# Patient Record
Sex: MALE | Race: WHITE | NOT HISPANIC OR LATINO | URBAN - METROPOLITAN AREA
[De-identification: names, ages, dates, MRNs, and addresses within clinical notes are randomized per-mention and may not be internally consistent; named-entity substitution may affect disease eponyms.]

---
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
## 2021-01-25 ENCOUNTER — APPOINTMENT (OUTPATIENT)
Dept: HEMATOLOGY ONCOLOGY | Facility: CLINIC | Age: 21
End: 2021-01-25
Payer: COMMERCIAL

## 2021-01-25 ENCOUNTER — LABORATORY RESULT (OUTPATIENT)
Age: 21
End: 2021-01-25

## 2021-01-25 VITALS
BODY MASS INDEX: 26.04 KG/M2 | DIASTOLIC BLOOD PRESSURE: 78 MMHG | HEART RATE: 79 BPM | HEIGHT: 71 IN | TEMPERATURE: 97.3 F | SYSTOLIC BLOOD PRESSURE: 130 MMHG | WEIGHT: 186 LBS

## 2021-01-25 PROCEDURE — 99213 OFFICE O/P EST LOW 20 MIN: CPT

## 2021-01-27 ENCOUNTER — RX RENEWAL (OUTPATIENT)
Age: 21
End: 2021-01-27

## 2021-01-30 LAB
HCT VFR BLD CALC: 46 %
HGB BLD-MCNC: 16.5 G/DL
MCHC RBC-ENTMCNC: 30.9 PG
MCHC RBC-ENTMCNC: 35.9 G/DL
MCV RBC AUTO: 86.1 FL
PLATELET # BLD AUTO: 171 K/UL
PMV BLD: 10 FL
PROT S AG ACT/NOR PPP IA: 20 %
PROT S FREE PPP-ACNC: 29 %
RBC # BLD: 5.34 M/UL
RBC # FLD: 11.6 %
WBC # FLD AUTO: 5.4 K/UL

## 2021-01-30 NOTE — ASSESSMENT
[FreeTextEntry1] : 21 yo male with FH of thrombophilia had unprovoked acute DVT in the left common femoral vein and in the left external iliac vein, s/p thrombectomy, on Eliquis.. \par Low Protein S activity and antigen.\par  \par Recommendation:\par -- Reviewed hypercoagulable workup results. Factor V Leiden and prothrombin gene mutation were negative. 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 Homocystine level was normal. ATIII and protein C activity were normal. Protein S activity and Ag were found low. Anticardiolipin antibodies and beta 2 glycoprotein I antibodies are not elevated.\par -- Protein S activity and antigen level can be affected by anticoagulant and other conditions. Will order repeat study today. \par -- Continue Eliquis 5 mg q12h. \par -- Discussed the duration of anticoagulation. Given he had unprovoked DVT at very young age and has protein S deficiency, he will need to be on long term AC. Will refer him to see Dr. Le for consultation.\par -- RTO for followup in 3 months. \par

## 2021-01-30 NOTE — CONSULT LETTER
[Courtesy Letter:] : I had the pleasure of seeing your patient, [unfilled], in my office today. [Please see my note below.] : Please see my note below. [Sincerely,] : Sincerely, [DrSammi  ___] : Dr. DIAZ [FreeTextEntry3] : Neo Aldana MD

## 2021-01-30 NOTE — HISTORY OF PRESENT ILLNESS
[de-identified] : 19 yo male with no significant PMH presented to ER at [facility] on 6/8/2020 for one month of left lower leg swelling. VA duplex revealed extensive DVT in the  the left left external iliac vein, left common femoral vein, CFV and popliteal vein. On the repeat Duplex, left low leg DVT resolved. He underwent thrombectomy on 6/9/2020. He was on heparin drip and then switched to Eliquis. There was no clear provoking event prior to the event. He has a family h/o thrombophilia in her father with PE at his age 35 and has been on life long anticoagulation.  He had hypercoagulable blood work while in the hospital. Factor V Leiden and prothrombin gene mutation were negative. 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 Homocystine level was normal. ATIII and protein C activity were normal. Protein S activity was low at 32%. These tests were done while he was on anticoagulation which may interfere with some of the test results. \par \par He was readmitted a week after he was discharged for high fever. Blood culture were sent. Blood culture grew MSSA. He has PCN allergy and was treated with Ancef. Fever subsided and repeat blood cultures were negative. JEREMIAS was negative. \par \par He is here today with his mother for the 1st office visit. He is feeling well. The left leg swelling has resolved. He has been taking Eliquis 5 mg q12h.  [de-identified] : 10/19/2020:\par The patient is here today for f/u visit. His mother is with him. He had unprovoked acute DVT in the left common femoral vein and in the left external iliac vein, s/p thrombectomy in 6/2020. He has been taking Eliquis 5 mg q12h. Hypercoagulable work showed Factor V Leiden and prothrombin gene mutation negative, ATIII normal. 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 Homocystine level was normal. ATIII and protein C activity were normal. Protein S activity was low at 32%. On 7/20/2020, repeat blood work was done. Protein S Ag was low at 17%. Protein S activity was also low at 40%. Since he is on Eliquis which may affect test result, a repeat study needs to be done in the future when he is off anticoagulant. Anticardiolipin antibodies and beta 2 glycoprotein I antibodies are not elevated.. He saw Dr. Westbrook on 10/2. Repeat venous duplex showed occlusive thrombus in the left femoral vein, partial resolution of thrombus in the left EIV, CFV and popliteal veins.\par He has been home since COVID19 pandemic. He put on about 10 pounds. \par \par 1/25/2021:\par The patient is here today for f/u visit. His mother is with him. He had unprovoked acute DVT in the left common femoral vein and in the left external iliac vein, s/p thrombectomy in 6/2020. He has been taking Eliquis 5 mg q12h. Hypercoagulable work was performed.  Factor V Leiden and prothrombin gene mutation were negative. 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 Homocystine level was normal. ATIII and protein C activity were normal. Protein S activity was low at 32% to 40% and Protein S Ag was low at 17%. Since he is on Eliquis which may affect test result, a repeat study needs to be done in the future when he is off anticoagulant. Anticardiolipin antibodies and beta 2 glycoprotein I antibodies are not elevated. He saw Dr. Westbrook on 10/5/2020. Repeat venous duplex showed occlusive thrombus in the left femoral vein, partial resolution of thrombus in the left EIV, CFV and popliteal veins. He feels well and does not have nay new complains.

## 2021-04-05 ENCOUNTER — APPOINTMENT (OUTPATIENT)
Dept: VASCULAR SURGERY | Facility: CLINIC | Age: 21
End: 2021-04-05
Payer: SUBSIDIZED

## 2021-04-05 ENCOUNTER — APPOINTMENT (OUTPATIENT)
Dept: VASCULAR SURGERY | Facility: CLINIC | Age: 21
End: 2021-04-05
Payer: COMMERCIAL

## 2021-04-05 VITALS — SYSTOLIC BLOOD PRESSURE: 126 MMHG | DIASTOLIC BLOOD PRESSURE: 89 MMHG

## 2021-04-05 VITALS — TEMPERATURE: 98 F

## 2021-04-05 PROCEDURE — 93971 EXTREMITY STUDY: CPT

## 2021-04-05 PROCEDURE — 99072 ADDL SUPL MATRL&STAF TM PHE: CPT

## 2021-04-05 PROCEDURE — 99213 OFFICE O/P EST LOW 20 MIN: CPT

## 2021-04-05 PROCEDURE — XXXXX: CPT

## 2021-04-05 NOTE — ASSESSMENT
[FreeTextEntry1] : 20 y/o gentleman with family h/o DVT (father on lifelong Coumadin),with unprovoked LLE DVT s/p thrombectomy and  on Eliquis since June 2020. \par \par Venous duplex performed today showed occlusive thrombus in the left  femoral vein, partial resolution of thrombus in the left EIV, CFV and popliteal veins.\par \par I have informed him of the test results and advised to continue on anticoagulation, follow up with Hematology as scheduled and see me back in 1 years time for repeat venous duplex.

## 2021-04-05 NOTE — DATA REVIEWED
[FreeTextEntry1] : I performed a venous duplex which was medically necessary to evaluate for acute DVT. It showed  left  partial recanalization of thrombus in the CF vein minimal recanalization in the distal femoral vein, partial

## 2021-04-05 NOTE — HISTORY OF PRESENT ILLNESS
[FreeTextEntry1] : 18 y/o gentleman with family h/o DVT (father on lifelong Coumadin), presented to ED in June 2020 for one month of left leg swelling and calf pain, found to have extensive DVT and underwent thrombectomy and venoplasty on 6/9/20, was discharged but readmitted for sepsis completed IV antibiotics.\par \par He presents for follow up, denies any new symptoms, has been compliant with stockings and Eliquis.

## 2021-04-26 ENCOUNTER — APPOINTMENT (OUTPATIENT)
Dept: HEMATOLOGY ONCOLOGY | Facility: CLINIC | Age: 21
End: 2021-04-26
Payer: COMMERCIAL

## 2021-04-26 ENCOUNTER — OUTPATIENT (OUTPATIENT)
Dept: OUTPATIENT SERVICES | Facility: HOSPITAL | Age: 21
LOS: 1 days | Discharge: HOME | End: 2021-04-26

## 2021-04-26 ENCOUNTER — LABORATORY RESULT (OUTPATIENT)
Age: 21
End: 2021-04-26

## 2021-04-26 VITALS
SYSTOLIC BLOOD PRESSURE: 126 MMHG | BODY MASS INDEX: 25.9 KG/M2 | WEIGHT: 185 LBS | HEIGHT: 71 IN | TEMPERATURE: 98.7 F | HEART RATE: 66 BPM | DIASTOLIC BLOOD PRESSURE: 60 MMHG

## 2021-04-26 DIAGNOSIS — Z92.89 PERSONAL HISTORY OF OTHER MEDICAL TREATMENT: Chronic | ICD-10-CM

## 2021-04-26 DIAGNOSIS — D68.59 OTHER PRIMARY THROMBOPHILIA: ICD-10-CM

## 2021-04-26 DIAGNOSIS — I82.412 ACUTE EMBOLISM AND THROMBOSIS OF LEFT FEMORAL VEIN: ICD-10-CM

## 2021-04-26 LAB
ALBUMIN SERPL ELPH-MCNC: 4.8 G/DL
ALP BLD-CCNC: 67 U/L
ALT SERPL-CCNC: 18 U/L
ANION GAP SERPL CALC-SCNC: 14 MMOL/L
AST SERPL-CCNC: 18 U/L
BILIRUB DIRECT SERPL-MCNC: <0.2 MG/DL
BILIRUB INDIRECT SERPL-MCNC: >0.5 MG/DL
BILIRUB SERPL-MCNC: 0.7 MG/DL
BUN SERPL-MCNC: 12 MG/DL
CALCIUM SERPL-MCNC: 9.9 MG/DL
CHLORIDE SERPL-SCNC: 104 MMOL/L
CO2 SERPL-SCNC: 23 MMOL/L
CREAT SERPL-MCNC: 0.9 MG/DL
GLUCOSE SERPL-MCNC: 98 MG/DL
HCT VFR BLD CALC: 44.1 %
HGB BLD-MCNC: 15.9 G/DL
MCHC RBC-ENTMCNC: 30.6 PG
MCHC RBC-ENTMCNC: 36.1 G/DL
MCV RBC AUTO: 84.8 FL
PLATELET # BLD AUTO: 214 K/UL
PMV BLD: 9.5 FL
POTASSIUM SERPL-SCNC: 4.2 MMOL/L
PROT SERPL-MCNC: 7.3 G/DL
RBC # BLD: 5.2 M/UL
RBC # FLD: 11.7 %
SODIUM SERPL-SCNC: 141 MMOL/L
WBC # FLD AUTO: 5.08 K/UL

## 2021-04-26 PROCEDURE — 99214 OFFICE O/P EST MOD 30 MIN: CPT

## 2021-04-26 NOTE — HISTORY OF PRESENT ILLNESS
[de-identified] : 21 yo male with no significant PMH presented to ER at [facility] on 6/8/2020 for one month of left lower leg swelling. VA duplex revealed extensive DVT in the  the left left external iliac vein, left common femoral vein, CFV and popliteal vein. On the repeat Duplex, left low leg DVT resolved. He underwent thrombectomy on 6/9/2020. He was on heparin drip and then switched to Eliquis. There was no clear provoking event prior to the event. He has a family h/o thrombophilia in her father with PE at his age 35 and has been on life long anticoagulation.  He had hypercoagulable blood work while in the hospital. Factor V Leiden and prothrombin gene mutation were negative. 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 Homocystine level was normal. ATIII and protein C activity were normal. Protein S activity was low at 32%. These tests were done while he was on anticoagulation which may interfere with some of the test results. \par \par He was readmitted a week after he was discharged for high fever. Blood culture were sent. Blood culture grew MSSA. He has PCN allergy and was treated with Ancef. Fever subsided and repeat blood cultures were negative. JEREMIAS was negative. \par \par He is here today with his mother for the 1st office visit. He is feeling well. The left leg swelling has resolved. He has been taking Eliquis 5 mg q12h.  [de-identified] : 10/19/2020:\par The patient is here today for f/u visit. He is accompanied by his mother. He had unprovoked acute DVT in the left common femoral vein and in the left external iliac vein, s/p thrombectomy in 6/2020. He has been taking Eliquis 5 mg q12h. Hypercoagulable work showed Factor V Leiden and prothrombin gene mutation negative, ATIII normal. 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 Homocystine level was normal. ATIII and protein C activity were normal. Protein S activity was low at 32%. On 7/20/2020, repeat blood work was done. Protein S Ag was low at 17%. Protein S activity was also low at 40%. Since he is on Eliquis which may affect test result, a repeat study needs to be done in the future when he is off anticoagulant. Anticardiolipin antibodies and beta 2 glycoprotein I antibodies are not elevated.. He saw Dr. Westbrook on 10/2. Repeat venous duplex showed occlusive thrombus in the left femoral vein, partial resolution of thrombus in the left EIV, CFV and popliteal veins.\par He has been home since COVID19 pandemic. He put on about 10 pounds. \par \par 1/25/2021:\par The patient is here today for f/u visit. His mother is with him. He had unprovoked acute DVT in the left common femoral vein and in the left external iliac vein, s/p thrombectomy in 6/2020. He has been taking Eliquis 5 mg q12h. Hypercoagulable work was performed.  Factor V Leiden and prothrombin gene mutation were negative. 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 Homocystine level was normal. ATIII and protein C activity were normal. Protein S activity was low at 32% to 40% and Protein S Ag was low at 17%. Since he is on Eliquis which may affect test result, a repeat study needs to be done in the future when he is off anticoagulant. Anticardiolipin antibodies and beta 2 glycoprotein I antibodies are not elevated. He saw Dr. Westbrook on 10/5/2020. Repeat venous duplex showed occlusive thrombus in the left femoral vein, partial resolution of thrombus in the left EIV, CFV and popliteal veins. He feels well and does not have nay new complains.\par \par 04/26/2021: \par The patient is here today for f/u visit. He is accompanied by his mother. He had unprovoked acute DVT in the left common femoral vein and in the left external iliac vein, s/p thrombectomy in 6/2020. He has been taking Eliquis 5 mg q12h. Hypercoagulable work was performed.  Factor V Leiden and prothrombin gene mutation were negative. Anticardiolipin antibodies and beta 2 glycoprotein I antibodies are not elevated. 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 Homocystine level was normal. ATIII and protein C activity were normal. Protein S activity was low at 40% and repeat in January 29% and Protein S Ag was low at 17% and repeat in January 20%. He saw Dr Westbrook on 4/05/21 venous duplex performed showed unchanged occlusive thrombus in the left femoral vein, partial resolution of thrombus in the left EIV, CFV and popliteal veins. He feels well and offers no complaints.

## 2021-04-27 LAB
T4 FREE SERPL-MCNC: 1.6 NG/DL
TSH SERPL-ACNC: 1.45 UIU/ML

## 2021-06-01 ENCOUNTER — RX RENEWAL (OUTPATIENT)
Age: 21
End: 2021-06-01

## 2021-06-29 ENCOUNTER — TRANSCRIPTION ENCOUNTER (OUTPATIENT)
Age: 21
End: 2021-06-29

## 2021-06-29 ENCOUNTER — APPOINTMENT (OUTPATIENT)
Dept: HEMATOLOGY ONCOLOGY | Facility: CLINIC | Age: 21
End: 2021-06-29
Payer: COMMERCIAL

## 2021-06-29 PROCEDURE — 99204 OFFICE O/P NEW MOD 45 MIN: CPT | Mod: 95

## 2021-07-09 ENCOUNTER — LABORATORY RESULT (OUTPATIENT)
Age: 21
End: 2021-07-09

## 2021-08-03 ENCOUNTER — OUTPATIENT (OUTPATIENT)
Dept: OUTPATIENT SERVICES | Facility: HOSPITAL | Age: 21
LOS: 1 days | Discharge: HOME | End: 2021-08-03

## 2021-08-03 ENCOUNTER — APPOINTMENT (OUTPATIENT)
Dept: HEMATOLOGY ONCOLOGY | Facility: CLINIC | Age: 21
End: 2021-08-03
Payer: COMMERCIAL

## 2021-08-03 VITALS
SYSTOLIC BLOOD PRESSURE: 136 MMHG | OXYGEN SATURATION: 98 % | RESPIRATION RATE: 16 BRPM | BODY MASS INDEX: 44.1 KG/M2 | HEART RATE: 71 BPM | WEIGHT: 315 LBS | TEMPERATURE: 98.7 F | DIASTOLIC BLOOD PRESSURE: 63 MMHG | HEIGHT: 71 IN

## 2021-08-03 DIAGNOSIS — Z92.89 PERSONAL HISTORY OF OTHER MEDICAL TREATMENT: Chronic | ICD-10-CM

## 2021-08-03 PROCEDURE — 99215 OFFICE O/P EST HI 40 MIN: CPT

## 2021-08-04 DIAGNOSIS — I82.412 ACUTE EMBOLISM AND THROMBOSIS OF LEFT FEMORAL VEIN: ICD-10-CM

## 2021-08-04 LAB
APTT BLD: 36.7 SEC
CONFIRM: 50.8 SEC
DRVVT 1H NP PPP: 45.6 SEC
DRVVT IMM 1:2 NP PPP: NORMAL
DRVVT SCREEN TO CONFIRM RATIO: 0.88 RATIO
FACT VIII ACT/NOR PPP: 104 %
INR PPP: 1.36 RATIO
PROT C PPP CHRO-ACNC: 66 %
PROT S AG ACT/NOR PPP IA: 19 %
PT BLD: 15.6 SEC
SCREEN DRVVT: 60.3 SEC
SILICA CLOTTING TIME INTERPRETATION: NORMAL
SILICA CLOTTING TIME S/C: 0.89 RATIO

## 2021-08-09 ENCOUNTER — NON-APPOINTMENT (OUTPATIENT)
Age: 21
End: 2021-08-09

## 2021-08-31 LAB
ANA SER IF-ACNC: NEGATIVE
PROT S FREE PPP-ACNC: 25 %

## 2021-12-16 ENCOUNTER — APPOINTMENT (OUTPATIENT)
Dept: HEMATOLOGY ONCOLOGY | Facility: CLINIC | Age: 21
End: 2021-12-16

## 2022-02-01 ENCOUNTER — OUTPATIENT (OUTPATIENT)
Dept: OUTPATIENT SERVICES | Facility: HOSPITAL | Age: 22
LOS: 1 days | Discharge: HOME | End: 2022-02-01

## 2022-02-01 ENCOUNTER — LABORATORY RESULT (OUTPATIENT)
Age: 22
End: 2022-02-01

## 2022-02-01 ENCOUNTER — APPOINTMENT (OUTPATIENT)
Dept: HEMATOLOGY ONCOLOGY | Facility: CLINIC | Age: 22
End: 2022-02-01
Payer: COMMERCIAL

## 2022-02-01 VITALS
OXYGEN SATURATION: 98 % | HEART RATE: 85 BPM | DIASTOLIC BLOOD PRESSURE: 73 MMHG | SYSTOLIC BLOOD PRESSURE: 139 MMHG | RESPIRATION RATE: 18 BRPM | HEIGHT: 71 IN | BODY MASS INDEX: 26.6 KG/M2 | WEIGHT: 189.99 LBS

## 2022-02-01 DIAGNOSIS — I82.412 ACUTE EMBOLISM AND THROMBOSIS OF LEFT FEMORAL VEIN: ICD-10-CM

## 2022-02-01 DIAGNOSIS — Z92.89 PERSONAL HISTORY OF OTHER MEDICAL TREATMENT: Chronic | ICD-10-CM

## 2022-02-01 LAB
HCT VFR BLD CALC: 43.9 %
HGB BLD-MCNC: 15.8 G/DL
MCHC RBC-ENTMCNC: 30.7 PG
MCHC RBC-ENTMCNC: 36 G/DL
MCV RBC AUTO: 85.2 FL
PLATELET # BLD AUTO: 182 K/UL
PMV BLD: 9.6 FL
RBC # BLD: 5.15 M/UL
RBC # FLD: 11.9 %
WBC # FLD AUTO: 4.13 K/UL

## 2022-02-01 PROCEDURE — 99215 OFFICE O/P EST HI 40 MIN: CPT

## 2022-02-02 DIAGNOSIS — I82.412 ACUTE EMBOLISM AND THROMBOSIS OF LEFT FEMORAL VEIN: ICD-10-CM

## 2022-02-02 DIAGNOSIS — D68.59 OTHER PRIMARY THROMBOPHILIA: ICD-10-CM

## 2022-02-02 LAB
ANION GAP SERPL CALC-SCNC: 10 MMOL/L
BUN SERPL-MCNC: 10 MG/DL
CALCIUM SERPL-MCNC: 9.5 MG/DL
CHLORIDE SERPL-SCNC: 105 MMOL/L
CO2 SERPL-SCNC: 25 MMOL/L
CREAT SERPL-MCNC: 0.9 MG/DL
GLUCOSE SERPL-MCNC: 86 MG/DL
POTASSIUM SERPL-SCNC: 4.5 MMOL/L
SODIUM SERPL-SCNC: 140 MMOL/L

## 2022-02-03 LAB — PROT C PPP CHRO-ACNC: 77 %

## 2022-04-04 ENCOUNTER — APPOINTMENT (OUTPATIENT)
Dept: VASCULAR SURGERY | Facility: CLINIC | Age: 22
End: 2022-04-04

## 2022-04-07 ENCOUNTER — APPOINTMENT (OUTPATIENT)
Dept: VASCULAR SURGERY | Facility: CLINIC | Age: 22
End: 2022-04-07
Payer: SUBSIDIZED

## 2022-04-07 VITALS
HEIGHT: 71 IN | SYSTOLIC BLOOD PRESSURE: 130 MMHG | BODY MASS INDEX: 26.6 KG/M2 | WEIGHT: 190 LBS | DIASTOLIC BLOOD PRESSURE: 80 MMHG

## 2022-04-07 PROCEDURE — 93971 EXTREMITY STUDY: CPT

## 2022-04-07 PROCEDURE — 99214 OFFICE O/P EST MOD 30 MIN: CPT

## 2022-04-07 PROCEDURE — 99072 ADDL SUPL MATRL&STAF TM PHE: CPT

## 2022-04-07 NOTE — ASSESSMENT
[FreeTextEntry1] : 22 y/o gentleman with family h/o DVT (father on lifelong Coumadin),with unprovoked LLE DVT s/p thrombectomy and  on Eliquis since June 2020. \par \par Venous duplex performed today chronic occlusive thrombus in left CFvein, residual wall thrombus in the femoral and popliteal veins.\par \par I have informed him of the test results and advised to continue on anticoagulation, follow up with Hematology as scheduled and see me back in 1 years time for repeat venous duplex.\par \par I spent 40 minutes with patient performing physical exam, reviewing duplex results, discussing treatment plan and followup.\par

## 2022-04-07 NOTE — DATA REVIEWED
[FreeTextEntry1] : I performed a venous duplex which was medically necessary to evaluate for acute DVT. It showed chronic occlusive thrombus in left CFvein, residual wall thrombus in the femoral and popliteal veins.

## 2022-08-02 ENCOUNTER — APPOINTMENT (OUTPATIENT)
Dept: HEMATOLOGY ONCOLOGY | Facility: CLINIC | Age: 22
End: 2022-08-02

## 2022-08-11 NOTE — ED ADULT NURSE NOTE - IS THE PATIENT ABLE TO BE SCREENED?
Daughter, Mauro Sender,  calling to find out procedure to schedule new patient appointment  I informed her and she will have referring physician send in referral   I also gave the the fax number [phone]  Yes

## 2022-09-27 ENCOUNTER — APPOINTMENT (OUTPATIENT)
Dept: HEMATOLOGY ONCOLOGY | Facility: CLINIC | Age: 22
End: 2022-09-27

## 2022-09-27 PROCEDURE — 99215 OFFICE O/P EST HI 40 MIN: CPT | Mod: 95

## 2022-11-15 RX ORDER — APIXABAN 5 MG/1
5 TABLET, FILM COATED ORAL
Qty: 180 | Refills: 2 | Status: ACTIVE | COMMUNITY
Start: 2020-09-18 | End: 1900-01-01

## 2022-11-15 RX ORDER — APIXABAN 5 MG/1
5 TABLET, FILM COATED ORAL
Refills: 0 | Status: DISCONTINUED | COMMUNITY
End: 2022-11-15

## 2022-11-15 RX ORDER — RIVAROXABAN 20 MG/1
20 TABLET, FILM COATED ORAL
Qty: 30 | Refills: 1 | Status: DISCONTINUED | COMMUNITY
Start: 2021-08-03 | End: 2022-11-15

## 2023-04-18 ENCOUNTER — APPOINTMENT (OUTPATIENT)
Dept: VASCULAR SURGERY | Facility: CLINIC | Age: 23
End: 2023-04-18
Payer: COMMERCIAL

## 2023-04-18 VITALS
HEART RATE: 74 BPM | BODY MASS INDEX: 27.3 KG/M2 | DIASTOLIC BLOOD PRESSURE: 78 MMHG | WEIGHT: 195 LBS | HEIGHT: 71 IN | SYSTOLIC BLOOD PRESSURE: 148 MMHG

## 2023-04-18 PROCEDURE — 93971 EXTREMITY STUDY: CPT

## 2023-04-18 PROCEDURE — 99212 OFFICE O/P EST SF 10 MIN: CPT

## 2023-04-18 NOTE — HISTORY OF PRESENT ILLNESS
[FreeTextEntry1] : 21 y/o gentleman with family h/o DVT (father on lifelong Coumadin), presented to ED in June 2020 for one month of left leg swelling and calf pain, found to have extensive DVT and underwent thrombectomy and venoplasty on 6/9/20, was discharged but readmitted for sepsis completed IV antibiotics.\par \par He presents for follow up, denies any new symptoms, has been compliant with stockings and Eliquis.

## 2023-04-18 NOTE — DATA REVIEWED
[FreeTextEntry1] : I performed a venous duplex which was medically necessary to evaluate for acute DVT.  There is no evidence of acute deep venous thrombosis.  However chronic changes in deep venous thrombosis with partial recanalization seen in the common femoral and popliteal veins.  There is minimal recannulization seen in the femoral vein at this time

## 2023-04-18 NOTE — ASSESSMENT
[FreeTextEntry1] : 23 y/o gentleman with family h/o DVT (father on lifelong Coumadin),with unprovoked LLE DVT s/p thrombectomy and  on Eliquis since June 2020.  Patient has a protein S deficiency and will need to be on long-term anticoagulation as per hematology\par \par Venous duplex performed today showed  There is no evidence of acute deep venous thrombosis.  However chronic changes in deep venous thrombosis with partial recanalization seen in the common femoral and popliteal veins.  There is minimal recannulization seen in the femoral vein at this time\par I have informed him of the test results and advised to continue on anticoagulation, follow up with Hematology as scheduled and see me back in 1 years time for repeat venous duplex.\par \par \par

## 2023-07-13 NOTE — DISCHARGE NOTE PROVIDER - NSDCHHPTASSISTHOME_GEN_ALL_CORE
[Cognitive and/or Behavior Therapy] : Cognitive and/or Behavior Therapy  [[facility]] : [facility]  [FreeTextEntry2] : 1. Improved mood and self esteem\par 2. Improved anxiety and stress management.  [de-identified] : Patient reports that mood and anxiety level remain variable, He spoke about mother's  and some concerns about what will happen next with her home and whether it will affect him.  He continues to express feelings of guilt about not providing happy life for his wife but is not willing to change certain things that would help with this. 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 Continued to work on challenging negative thoughts more actively and maintaining better boundaries to reduce anger and resentment. Worked on finding more things to do to maintain some structure which helps mood  and things he can do to increase sense of satisfaction. Patient Needs Assistance to Leave Residence...

## 2023-09-05 ENCOUNTER — LABORATORY RESULT (OUTPATIENT)
Age: 23
End: 2023-09-05

## 2023-09-05 ENCOUNTER — OUTPATIENT (OUTPATIENT)
Dept: OUTPATIENT SERVICES | Facility: HOSPITAL | Age: 23
LOS: 1 days | End: 2023-09-05
Payer: COMMERCIAL

## 2023-09-05 ENCOUNTER — APPOINTMENT (OUTPATIENT)
Dept: HEMATOLOGY ONCOLOGY | Facility: CLINIC | Age: 23
End: 2023-09-05
Payer: COMMERCIAL

## 2023-09-05 VITALS
DIASTOLIC BLOOD PRESSURE: 81 MMHG | WEIGHT: 180 LBS | TEMPERATURE: 98.7 F | SYSTOLIC BLOOD PRESSURE: 134 MMHG | HEART RATE: 92 BPM | RESPIRATION RATE: 18 BRPM | BODY MASS INDEX: 25.2 KG/M2 | HEIGHT: 71 IN

## 2023-09-05 DIAGNOSIS — I82.412 ACUTE EMBOLISM AND THROMBOSIS OF LEFT FEMORAL VEIN: ICD-10-CM

## 2023-09-05 DIAGNOSIS — Z92.89 PERSONAL HISTORY OF OTHER MEDICAL TREATMENT: Chronic | ICD-10-CM

## 2023-09-05 DIAGNOSIS — D68.59 OTHER PRIMARY THROMBOPHILIA: ICD-10-CM

## 2023-09-05 LAB
HCT VFR BLD CALC: 45.7 %
HGB BLD-MCNC: 16.2 G/DL
MCHC RBC-ENTMCNC: 29.9 PG
MCHC RBC-ENTMCNC: 35.4 G/DL
MCV RBC AUTO: 84.3 FL
PLATELET # BLD AUTO: 186 K/UL
PMV BLD: 9.7 FL
RBC # BLD: 5.42 M/UL
RBC # FLD: 12 %
WBC # FLD AUTO: 4.58 K/UL

## 2023-09-05 PROCEDURE — 99215 OFFICE O/P EST HI 40 MIN: CPT

## 2023-09-05 PROCEDURE — 80053 COMPREHEN METABOLIC PANEL: CPT

## 2023-09-05 PROCEDURE — 85027 COMPLETE CBC AUTOMATED: CPT

## 2023-09-05 PROCEDURE — 85306 CLOT INHIBIT PROT S FREE: CPT

## 2023-09-06 DIAGNOSIS — I82.412 ACUTE EMBOLISM AND THROMBOSIS OF LEFT FEMORAL VEIN: ICD-10-CM

## 2023-09-06 LAB
ALBUMIN SERPL ELPH-MCNC: 4.9 G/DL
ALP BLD-CCNC: 63 U/L
ALT SERPL-CCNC: 18 U/L
ANION GAP SERPL CALC-SCNC: 13 MMOL/L
AST SERPL-CCNC: 18 U/L
BILIRUB SERPL-MCNC: 0.8 MG/DL
BUN SERPL-MCNC: 9 MG/DL
CALCIUM SERPL-MCNC: 9.7 MG/DL
CHLORIDE SERPL-SCNC: 102 MMOL/L
CO2 SERPL-SCNC: 26 MMOL/L
CREAT SERPL-MCNC: 1.03 MG/DL
EGFR: 106 ML/MIN/1.73M2
GLUCOSE SERPL-MCNC: 61 MG/DL
POTASSIUM SERPL-SCNC: 4.4 MMOL/L
PROT S AG ACT/NOR PPP IA: 16 %
PROT SERPL-MCNC: 7 G/DL
SODIUM SERPL-SCNC: 141 MMOL/L

## 2023-09-11 LAB — PROT S FREE PPP-ACNC: 28 %

## 2023-10-03 ENCOUNTER — APPOINTMENT (OUTPATIENT)
Dept: SURGERY | Facility: CLINIC | Age: 23
End: 2023-10-03

## 2023-11-07 NOTE — ED ADULT TRIAGE NOTE - CHIEF COMPLAINT QUOTE
Patient sent by urgent care to R/O DVT of left calf. Patient complaining of swelling to left calf as well as pain to left calf area. History of DVT. Rifampin Counseling: I discussed with the patient the risks of rifampin including but not limited to liver damage, kidney damage, red-orange body fluids, nausea/vomiting and severe allergy.

## 2024-03-11 NOTE — DISCHARGE NOTE PROVIDER - HOSPITAL COURSE
Detail Level: Zone 20 y/o male with PMHx migraine headache presents to ED for one month of left lower leg swelling. patient denies any pain. PT was seen at  and sent to ED for US. No numbness/tingling.    Has significant FHx for DVT as his father takes Coumadin for some hypercoagulable condition.        Pt was found to have extensive DVT : iliac, CFV and pop    Started on Heparin drip. Hematology consulted. Hypercoag work up initiated.     Pt went to OR 6/9/20 and underwent thrombectomy.    Heparin was restarted post procedure. On POD 1 pt was started on Eliquis and heparin was discontinued.     He was given Rx for compression stockings and Rx Eliquis sent to his pharmacy. Pt was ambulating prior to discharge

## 2024-03-26 ENCOUNTER — APPOINTMENT (OUTPATIENT)
Dept: VASCULAR SURGERY | Facility: CLINIC | Age: 24
End: 2024-03-26
Payer: COMMERCIAL

## 2024-03-26 PROCEDURE — 93971 EXTREMITY STUDY: CPT

## 2024-04-16 ENCOUNTER — APPOINTMENT (OUTPATIENT)
Dept: VASCULAR SURGERY | Facility: CLINIC | Age: 24
End: 2024-04-16
Payer: COMMERCIAL

## 2024-04-16 VITALS
SYSTOLIC BLOOD PRESSURE: 133 MMHG | BODY MASS INDEX: 25.2 KG/M2 | WEIGHT: 180 LBS | HEIGHT: 71 IN | DIASTOLIC BLOOD PRESSURE: 71 MMHG

## 2024-04-16 DIAGNOSIS — M79.89 OTHER SPECIFIED SOFT TISSUE DISORDERS: ICD-10-CM

## 2024-04-16 DIAGNOSIS — I82.409 ACUTE EMBOLISM AND THROMBOSIS OF UNSPECIFIED DEEP VEINS OF UNSPECIFIED LOWER EXTREMITY: ICD-10-CM

## 2024-04-16 PROCEDURE — 93970 EXTREMITY STUDY: CPT

## 2024-04-16 PROCEDURE — 99212 OFFICE O/P EST SF 10 MIN: CPT

## 2024-04-16 RX ORDER — APIXABAN 5 MG/1
5 TABLET, FILM COATED ORAL
Qty: 180 | Refills: 3 | Status: ACTIVE | COMMUNITY
Start: 2024-04-16 | End: 1900-01-01

## 2024-04-16 NOTE — HISTORY OF PRESENT ILLNESS
[FreeTextEntry1] : 22 y/o gentleman with family h/o DVT (father on lifelong Coumadin), presented to ED in June 2020 for one month of left leg swelling and calf pain, found to have extensive DVT and underwent thrombectomy and venoplasty on 6/9/20, was discharged but readmitted for sepsis completed IV antibiotics.  He presents for follow up, denies any new symptoms, has been compliant with stockings and Eliquis.

## 2024-04-16 NOTE — ASSESSMENT
[FreeTextEntry1] : 24 y/o gentleman with family h/o DVT (father on lifelong Coumadin),with unprovoked LLE DVT s/p thrombectomy and  on Eliquis since June 2020.  Patient has a protein S deficiency and will need to be on long-term anticoagulation as per hematology  Venous duplex performed today showed  There is no evidence of acute deep venous thrombosis.  However chronic changes in deep venous thrombosis with partial recanalization seen in the common femoral and popliteal veins.  There is minimal recannulization seen in the femoral vein at this time I have informed him of the test results and advised to continue on anticoagulation, follow up with Hematology as scheduled and see me back in 1 years time for repeat venous duplex.   I, Dr. Robert Westbrook, personally performed the evaluation and management (E/M) services for this established patient who presents today with (a) new problem(s)/exacerbation of (an) existing condition(s). That E/M includes conducting the clinically appropriate interval history &/or exam, assessing all new/exacerbated conditions, and establishing a new plan of care. Today, my HEBER, Ghazal Plaza PA-C, was here to observe my evaluation and management service for this new problem/exacerbated condition and follow the plan of care established by me going forward.

## 2024-09-10 ENCOUNTER — APPOINTMENT (OUTPATIENT)
Age: 24
End: 2024-09-10

## 2024-09-10 ENCOUNTER — APPOINTMENT (OUTPATIENT)
Age: 24
End: 2024-09-10
Payer: COMMERCIAL

## 2024-09-10 ENCOUNTER — OUTPATIENT (OUTPATIENT)
Dept: OUTPATIENT SERVICES | Facility: HOSPITAL | Age: 24
LOS: 1 days | End: 2024-09-10
Payer: COMMERCIAL

## 2024-09-10 DIAGNOSIS — I82.412 ACUTE EMBOLISM AND THROMBOSIS OF LEFT FEMORAL VEIN: ICD-10-CM

## 2024-09-10 DIAGNOSIS — Z92.89 PERSONAL HISTORY OF OTHER MEDICAL TREATMENT: Chronic | ICD-10-CM

## 2024-09-10 DIAGNOSIS — D68.59 OTHER PRIMARY THROMBOPHILIA: ICD-10-CM

## 2024-09-10 PROCEDURE — G2211 COMPLEX E/M VISIT ADD ON: CPT | Mod: NC,95

## 2024-09-10 PROCEDURE — 99215 OFFICE O/P EST HI 40 MIN: CPT | Mod: 95

## 2025-02-28 ENCOUNTER — EMERGENCY (EMERGENCY)
Facility: HOSPITAL | Age: 25
LOS: 0 days | Discharge: HOME | End: 2025-02-28
Attending: EMERGENCY MEDICINE
Payer: COMMERCIAL

## 2025-02-28 VITALS
RESPIRATION RATE: 18 BRPM | TEMPERATURE: 98 F | HEART RATE: 92 BPM | HEIGHT: 71 IN | WEIGHT: 203.93 LBS | DIASTOLIC BLOOD PRESSURE: 75 MMHG | SYSTOLIC BLOOD PRESSURE: 149 MMHG | OXYGEN SATURATION: 99 %

## 2025-02-28 DIAGNOSIS — Z79.01 LONG TERM (CURRENT) USE OF ANTICOAGULANTS: ICD-10-CM

## 2025-02-28 DIAGNOSIS — Z92.89 PERSONAL HISTORY OF OTHER MEDICAL TREATMENT: Chronic | ICD-10-CM

## 2025-02-28 DIAGNOSIS — Z86.718 PERSONAL HISTORY OF OTHER VENOUS THROMBOSIS AND EMBOLISM: ICD-10-CM

## 2025-02-28 DIAGNOSIS — Z88.0 ALLERGY STATUS TO PENICILLIN: ICD-10-CM

## 2025-02-28 DIAGNOSIS — R51.9 HEADACHE, UNSPECIFIED: ICD-10-CM

## 2025-02-28 DIAGNOSIS — G43.909 MIGRAINE, UNSPECIFIED, NOT INTRACTABLE, WITHOUT STATUS MIGRAINOSUS: ICD-10-CM

## 2025-02-28 DIAGNOSIS — R11.2 NAUSEA WITH VOMITING, UNSPECIFIED: ICD-10-CM

## 2025-02-28 LAB
ALBUMIN SERPL ELPH-MCNC: 5.2 G/DL — SIGNIFICANT CHANGE UP (ref 3.5–5.2)
ALP SERPL-CCNC: 80 U/L — SIGNIFICANT CHANGE UP (ref 30–115)
ALT FLD-CCNC: 42 U/L — HIGH (ref 0–41)
ANION GAP SERPL CALC-SCNC: 12 MMOL/L — SIGNIFICANT CHANGE UP (ref 7–14)
APTT BLD: 24.5 SEC — LOW (ref 27–39.2)
AST SERPL-CCNC: 27 U/L — SIGNIFICANT CHANGE UP (ref 0–41)
BASOPHILS # BLD AUTO: 0.02 K/UL — SIGNIFICANT CHANGE UP (ref 0–0.2)
BASOPHILS NFR BLD AUTO: 0.2 % — SIGNIFICANT CHANGE UP (ref 0–1)
BILIRUB SERPL-MCNC: 0.8 MG/DL — SIGNIFICANT CHANGE UP (ref 0.2–1.2)
BUN SERPL-MCNC: 14 MG/DL — SIGNIFICANT CHANGE UP (ref 10–20)
CALCIUM SERPL-MCNC: 9.6 MG/DL — SIGNIFICANT CHANGE UP (ref 8.4–10.5)
CHLORIDE SERPL-SCNC: 100 MMOL/L — SIGNIFICANT CHANGE UP (ref 98–110)
CO2 SERPL-SCNC: 25 MMOL/L — SIGNIFICANT CHANGE UP (ref 17–32)
CREAT SERPL-MCNC: 1 MG/DL — SIGNIFICANT CHANGE UP (ref 0.7–1.5)
EGFR: 108 ML/MIN/1.73M2 — SIGNIFICANT CHANGE UP
EOSINOPHIL # BLD AUTO: 0 K/UL — SIGNIFICANT CHANGE UP (ref 0–0.7)
EOSINOPHIL NFR BLD AUTO: 0 % — SIGNIFICANT CHANGE UP (ref 0–8)
GLUCOSE SERPL-MCNC: 104 MG/DL — HIGH (ref 70–99)
HCT VFR BLD CALC: 44.6 % — SIGNIFICANT CHANGE UP (ref 42–52)
HGB BLD-MCNC: 16 G/DL — SIGNIFICANT CHANGE UP (ref 14–18)
IMM GRANULOCYTES NFR BLD AUTO: 0.4 % — HIGH (ref 0.1–0.3)
INR BLD: 1 RATIO — SIGNIFICANT CHANGE UP (ref 0.65–1.3)
LYMPHOCYTES # BLD AUTO: 0.5 K/UL — LOW (ref 1.2–3.4)
LYMPHOCYTES # BLD AUTO: 4.5 % — LOW (ref 20.5–51.1)
MCHC RBC-ENTMCNC: 30.7 PG — SIGNIFICANT CHANGE UP (ref 27–31)
MCHC RBC-ENTMCNC: 35.9 G/DL — SIGNIFICANT CHANGE UP (ref 32–37)
MCV RBC AUTO: 85.4 FL — SIGNIFICANT CHANGE UP (ref 80–94)
MONOCYTES # BLD AUTO: 0.33 K/UL — SIGNIFICANT CHANGE UP (ref 0.1–0.6)
MONOCYTES NFR BLD AUTO: 3 % — SIGNIFICANT CHANGE UP (ref 1.7–9.3)
NEUTROPHILS # BLD AUTO: 10.25 K/UL — HIGH (ref 1.4–6.5)
NEUTROPHILS NFR BLD AUTO: 91.9 % — HIGH (ref 42.2–75.2)
NRBC BLD AUTO-RTO: 0 /100 WBCS — SIGNIFICANT CHANGE UP (ref 0–0)
PLATELET # BLD AUTO: 222 K/UL — SIGNIFICANT CHANGE UP (ref 130–400)
PMV BLD: 10.7 FL — HIGH (ref 7.4–10.4)
POTASSIUM SERPL-MCNC: 4.2 MMOL/L — SIGNIFICANT CHANGE UP (ref 3.5–5)
POTASSIUM SERPL-SCNC: 4.2 MMOL/L — SIGNIFICANT CHANGE UP (ref 3.5–5)
PROT SERPL-MCNC: 7.5 G/DL — SIGNIFICANT CHANGE UP (ref 6–8)
PROTHROM AB SERPL-ACNC: 11.8 SEC — SIGNIFICANT CHANGE UP (ref 9.95–12.87)
RBC # BLD: 5.22 M/UL — SIGNIFICANT CHANGE UP (ref 4.7–6.1)
RBC # FLD: 12.1 % — SIGNIFICANT CHANGE UP (ref 11.5–14.5)
SODIUM SERPL-SCNC: 137 MMOL/L — SIGNIFICANT CHANGE UP (ref 135–146)
WBC # BLD: 11.14 K/UL — HIGH (ref 4.8–10.8)
WBC # FLD AUTO: 11.14 K/UL — HIGH (ref 4.8–10.8)

## 2025-02-28 PROCEDURE — 93010 ELECTROCARDIOGRAM REPORT: CPT

## 2025-02-28 PROCEDURE — 96375 TX/PRO/DX INJ NEW DRUG ADDON: CPT

## 2025-02-28 PROCEDURE — 99284 EMERGENCY DEPT VISIT MOD MDM: CPT

## 2025-02-28 PROCEDURE — 80053 COMPREHEN METABOLIC PANEL: CPT

## 2025-02-28 PROCEDURE — 85730 THROMBOPLASTIN TIME PARTIAL: CPT

## 2025-02-28 PROCEDURE — 99284 EMERGENCY DEPT VISIT MOD MDM: CPT | Mod: 25

## 2025-02-28 PROCEDURE — 85610 PROTHROMBIN TIME: CPT

## 2025-02-28 PROCEDURE — 36415 COLL VENOUS BLD VENIPUNCTURE: CPT

## 2025-02-28 PROCEDURE — 85025 COMPLETE CBC W/AUTO DIFF WBC: CPT

## 2025-02-28 PROCEDURE — 96374 THER/PROPH/DIAG INJ IV PUSH: CPT

## 2025-02-28 PROCEDURE — 93005 ELECTROCARDIOGRAM TRACING: CPT

## 2025-02-28 RX ORDER — BACTERIOSTATIC SODIUM CHLORIDE 0.9 %
2000 VIAL (ML) INJECTION ONCE
Refills: 0 | Status: COMPLETED | OUTPATIENT
Start: 2025-02-28 | End: 2025-02-28

## 2025-02-28 RX ORDER — DIPHENHYDRAMINE HCL 25 MG
25 CAPSULE ORAL ONCE
Refills: 0 | Status: COMPLETED | OUTPATIENT
Start: 2025-02-28 | End: 2025-02-28

## 2025-02-28 RX ORDER — METOCLOPRAMIDE 10 MG/1
10 TABLET ORAL ONCE
Refills: 0 | Status: COMPLETED | OUTPATIENT
Start: 2025-02-28 | End: 2025-02-28

## 2025-02-28 RX ORDER — KETOROLAC TROMETHAMINE 10 MG
30 TABLET ORAL ONCE
Refills: 0 | Status: DISCONTINUED | OUTPATIENT
Start: 2025-02-28 | End: 2025-02-28

## 2025-02-28 RX ORDER — ONDANSETRON 4 MG/1
4 TABLET, ORALLY DISINTEGRATING ORAL ONCE
Refills: 0 | Status: COMPLETED | OUTPATIENT
Start: 2025-02-28 | End: 2025-02-28

## 2025-02-28 RX ADMIN — Medication 100 MILLIGRAM(S): at 19:50

## 2025-02-28 RX ADMIN — Medication 2000 MILLILITER(S): at 19:50

## 2025-02-28 RX ADMIN — Medication 30 MILLIGRAM(S): at 19:50

## 2025-02-28 RX ADMIN — ONDANSETRON 4 MILLIGRAM(S): 4 TABLET, ORALLY DISINTEGRATING ORAL at 20:11

## 2025-02-28 RX ADMIN — METOCLOPRAMIDE 104 MILLIGRAM(S): 10 TABLET ORAL at 19:50

## 2025-02-28 NOTE — ED PROVIDER NOTE - CARE PLAN
CW met with pt  Pt has signed 201 voluntary commitment form  CW read and reviewed 201 rights w/pt  CW advised pt of bed placement process  CW placed 201 commitment in pt chart binder on the unit  CW sent TT to [facility] requesting completion of document      TDS, CW 1 Principal Discharge DX:	Migraine headache

## 2025-02-28 NOTE — ED PROVIDER NOTE - OBJECTIVE STATEMENT
24 y.o. male, PMH of DVT on Eliquis, Migraines, comes in c/o HA, n/v which started earlier today and is consistent with his usual migraine HA. Tried taking Motrin but vomited it. No trauma/falls, vision changes, difficulty ambulating. No neck pain or back pain.

## 2025-02-28 NOTE — ED ADULT NURSE NOTE - OBJECTIVE STATEMENT
[facility] Internal Medicine   Progress Note              Subjective:    Patient seen and examined by me  No acute events overnight  Says she feels improved  Has lumps on her anterior abdomen, less painful than priorr       14 point Review of systems is negative except for as noted above.     I/O's    Intake/Output Summary (Last 24 hours) at 3/8/2023 1205  Last data filed at 3/8/2023 0800  Gross per 24 hour   Intake 300 ml   Output 0 ml   Net 300 ml         ALLERGIES:  Penicillins     No data recorded  MAP (mmHg)  Av.6  Min: 57  Max: 89          HOSPITAL MEDS  Current Facility-Administered Medications   Medication Dose Route Frequency Provider Last Rate Last Admin   • insulin lispro (ADMELOG,HumaLOG) - Correction Dose   Subcutaneous TID WC Christ Gibson MD       • amitriptyline (ELAVIL) tablet 50 mg  50 mg Oral QHS Christ Gibson MD       • atorvastatin (LIPITOR) tablet 40 mg  40 mg Oral Daily Christ Gibson MD   40 mg at 23 0917   • pantoprazole (PROTONIX) EC tablet 40 mg  40 mg Oral QAM AC Christ Gibosn MD   40 mg at 23 0615   • insulin lispro (ADMELOG,HumaLOG) - Correction Dose   Subcutaneous Nightly AIDA Mckeon       • heparin (porcine) injection 5,000 Units  5,000 Units Subcutaneous 3 times per day Bruce Britt MD       • gabapentin (NEURONTIN) capsule 300 mg  300 mg Oral TID Bruce Britt MD           Current Facility-Administered Medications   Medication Dose Route Frequency Provider Last Rate Last Admin   • lactated ringers infusion   Intravenous Continuous Elda Pradhan  mL/hr at 23 0114 New Bag at 23 0114       Current Facility-Administered Medications   Medication Dose Route Frequency Provider Last Rate Last Admin   • dextrose 50 % injection 25 g  25 g Intravenous PRN Christ Gibson MD       • dextrose 50 % injection 12.5 g  12.5 g Intravenous PRN Christ Gibson MD       • glucagon (GLUCAGEN) injection 1 mg  1 mg Intramuscular PRN  Christ Gibson MD       • dextrose (GLUTOSE) 40 % gel 15 g  15 g Oral PRN Christ Gibson MD       • dextrose (GLUTOSE) 40 % gel 30 g  30 g Oral PRN Christ Gibson MD       • sodium chloride (NORMAL SALINE) 0.9 % bolus 500 mL  500 mL Intravenous PRN Christ Gibson MD       • guaiFENesin 100 MG/5ML solution 200 mg  200 mg Oral Q4H PRN Christ Gibson MD       • bisacodyl (DULCOLAX) EC tablet 5 mg  5 mg Oral Daily PRN Christ Gibson MD       • simethicone (MYLICON) tablet 125 mg  125 mg Oral 4x Daily PRN Christ Gibson MD       • hydrALAZINE (APRESOLINE) tablet 25 mg  25 mg Oral TID PRN Christ Gibson MD       • melatonin tablet 3 mg  3 mg Oral Nightly PRN Christ Gibson MD       • polyethylene glycol (MIRALAX) packet 17 g  17 g Oral Daily PRN Christ Gibson MD       • potassium CHLORIDE (KLOR-CON M) laura ER tablet 40 mEq  40 mEq Oral Q4H PRN Christ Gibson MD       • acetaminophen (TYLENOL) tablet 650 mg  650 mg Oral Q4H PRN Christ Gibson MD       • ondansetron (ZOFRAN) injection 4 mg  4 mg Intravenous Q4H PRN Christ Gibson MD              Last Recorded Vitals      SpO2 Readings from Last 3 Encounters:   03/08/23 98%   01/12/23 99%   01/12/23 100%        VITAL SIGNS:     Vital Last Value 24 Hour Range   Temperature 97.9 °F (36.6 °C) (03/08/23 0806) Temp  Min: 97.7 °F (36.5 °C)  Max: 97.9 °F (36.6 °C)   Pulse (!) 104 (03/08/23 0806) Pulse  Min: 94  Max: 120   Respiratory 20 (03/08/23 0616) Resp  Min: 16  Max: 23   Non-Invasive  Blood Pressure 99/68 (03/08/23 0806) BP  Min: 90/48  Max: 117/69   Pulse Oximetry 98 % (03/08/23 0806) SpO2  Min: 90 %  Max: 99 %   Arterial   Blood Pressure   No data recorded      Vital Today Admitted   Weight 131.5 kg (289 lb 14.5 oz) (03/08/23 0535) Weight: 131.5 kg (289 lb 14.5 oz) (03/08/23 0535)   Height N/A Height: 5' 2\" (157.5 cm) (03/08/23 0535)   BMI N/A BMI (Calculated): 53.02 (03/08/23 0535)            Physical Exam:  General: Alert and oriented, no acute distress,  obese  Eyes: no scleral icterus, no conjunctival erythema   Cardio: S1, S2, RRR, no murmur, rub, gallop or thrills noted.   Pulm: Lungs clear to auscultation bilaterally, no wheeze or rhonchi noted. No chest wall tenderness  GI: Soft, non-tender, nondistended. Normal bowel sounds auscultated x4 quadrants. Has tender lumps on her anterior abdomen without any erythema or scarring  : No suprapubic Tenderness, no CVA tenderness bilaterally  Ext: No upper or lower extremity edema noted. No cords palpated.   Musculoskeletal: 5/5 strength both upper and lower extremities. No joint tenderness or erythema.  Skin: No abnormal bruising or discoloration noted. No jaundice.   Psych: Appropriate mood and affect. Good Insight and Judgment  Neuro: No focal motor or sensory deficits noted. Pt appropriately follows commands.    Labs   Recent Labs     03/07/23  1806 03/08/23  0549   WBC 12.7* 10.1   RBC 4.15 3.58*   HGB 11.2* 9.8*   HCT 34.9* 30.4*    304   MCV 84.1 84.9   MCH 27.0 27.4   MCHC 32.1 32.2   NRBCRE 0 0         Recent Labs     03/06/23  1145 03/07/23  1806 03/08/23  0549   SODIUM 134* 131* 134*   POTASSIUM 4.1 3.6 3.8   PHOS 4.4  --   --    CO2 24 25 22   ANIONGAP 14 11 12   GLUCOSE 172* 126* 122*   BUN 32* 34* 32*   CREATININE 1.13* 1.40* 1.30*   BCRAT 28* 24 25   CALCIUM 11.7* 11.2* 9.9   BILIRUBIN  --  0.4 0.3   AST  --  22 19   GPT  --  25 21   ALKPT  --  119* 104   GLOB  --  4.9* 4.2*   AGR  --  0.7* 0.7*        Recent Labs   Lab 03/08/23  0549 03/07/23  1806 03/06/23  1145   SODIUM 134* 131* 134*   POTASSIUM 3.8 3.6 4.1   CHLORIDE 104 99 100   CO2 22 25 24   BUN 32* 34* 32*   CREATININE 1.30* 1.40* 1.13*   GLUCOSE 122* 126* 172*   ALBUMIN 3.0* 3.6 3.5*   PHOS  --   --  4.4   AST 19 22  --    BILIRUBIN 0.3 0.4  --        No results for input(s): PCT in the last 72 hours.     No results found     Recent Labs     03/07/23  2305   INR 1.0   PT 10.5   PTT 25       Recent Labs   Lab 03/08/23  0851 03/08/23  1115    GLUCOSE BEDSIDE 127* 122*       Imaging    CT ABDOMEN PELVIS W CONTRAST - IV contrast only   Final Result   1.  Diffuse skin thickening noted overlying the left lower anterior    abdomen subcutaneous stranding.  Findings likely relate to cellulitis.    2.  Normal appendix.    3.  No evidence of bowel obstruction.    4.  Colonic diverticulosis.  No evidence of diverticulitis.    5.  No other acute findings.   6.  Incidental findings as described.            Electronically signed by Isaias Zuniga MD on 03 08 23 at 04:44      XR CHEST PA OR AP 1 VIEW   Final Result      No acute cardiopulmonary abnormality.      Electronically Signed by: SHEFALI MORALES M.D.    Signed on: 3/7/2023 11:35 PM    Workstation ID: [id number]      US SOFT TISSUE ABDOMINAL WALL    (Results Pending)       Cultures  Microbiology Results     None          All imaging, labs, vitals and related tests have been reviewed and interpreted by me.     Assessment/Plan:    JENNIFER on chronic kidney disease stage III  - Status post IV fluids in the ER  - Continue to monitor renal function closely  - Avoid nephrotoxic agents  - Nephrologist consulted  - improving      Hypercalcemia: 11.2 likely dehydration, excessive TUMS intake  - corrected calcium 11.8, now wnl  - continue IV fluids  - monitor  - r/u malignancy, sarcoidosis  - CT abdomen/pelvis showing skin thickening of abd pannus, otherwise neg  - endo, nephro following  - PTH pending, spep/upep pending     Tachycardia: Sinus, rule out sepsis  Thickened abd wall  - EKG Sinus tachycardia, no acute ischemic changes  -Troponins negative  - Monitor on telemetry  - Chest x-ray negative for acute cardiopulmonary process  - Elevated lactic acid level, downtrended, continue IV fluids  - CT abdomen pelvis showing skin thickening of abd pannus, otherwise neg  - Urinalysis neg  - Blood cultures pending  - Hold Lasix  - US abdominal soft tissue pending  - will hold off on wound care, as no signs of  wound/infection noted  - consult ID     Hypertension: Currently soft BPs  - IV fluid resuscitation  - Hold antihypertensives--lisinopril-hydrochlorothiazide secondary to borderline hypotension and JENNIFER  - will resume as needed     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  - at home on metformin, Trulicity, glipizide  - Continue low-dose insulin sliding scale while hospitalized  - continue trulicity and glipizide  - We will resume home medications on discharge  - most recent A1C  8.2 on 1/10/2023  - endo following     Morbid Obesity:  - BMI 51  - encourage lifestyle modifications  - on metformin  - advised regular exercise, she verbalized understanding       DVT Prophylaxis:   Current Active Medications for DVT Prophylaxis (From admission, onward)         Stop     heparin (porcine) injection 5,000 Units  5,000 Units,   Subcutaneous,   3 times per day         --                Diet:   Dietary Orders (From admission, onward)     Start     Ordered    03/08/23 0041  Consistent Carb Moderate (45-75 gm/meal), Sodium 2 gm (Low Sodium) Diet  DIET EFFECTIVE NOW        Question Answer Comment   Diet Modifiers Consistent Carb Moderate (45-75 gm/meal)    Diet Modifiers Sodium 2 gm (Low Sodium)        03/08/23 0041               Baseline Activity: Ambulates Independently    CODE STATUS:     Code Status: Full Resuscitation    Physician Notification:  Consultants notified of patient via [employer].  Communication: with patient, nurse       Due to the development of diagnoses during acute hospital stay, H&P and progress notes will not be shared in real time with the patient to ensure that proper explanations and reviews can be done with the patient. Pre-mature information may sometimes be harmful to the patient if not relayed in a compassionate and well timed manner, and most often are best relayed by the physician. No information is withheld from the patient or family, and all documentation may be requested after  patient is discharged once diagnoses and conditions have been confirmed. All discharge summaries with final diagnoses and workups as well as follow ups will be available to the patient via [employer] [facility] unless otherwise appropriate.     Primary Care Physician  MD Bruce Pereira MD [facility]  3/8/2023 12:05 PM        Presented to ED c/o right sided headache that started today, has h/o migraines with numbness on face as auras. Also c/o vomiting.

## 2025-02-28 NOTE — ED PROVIDER NOTE - PATIENT PORTAL LINK FT
You can access the FollowMyHealth Patient Portal offered by [facility] by registering at the following website: [email or web address]. By joining [employer]’s FollowMyHealth portal, you will also be able to view your health information using other applications (apps) compatible with our system.

## 2025-02-28 NOTE — ED PROVIDER NOTE - CLINICAL SUMMARY MEDICAL DECISION MAKING FREE TEXT BOX
Pt is here with migraine. HA resolved after meds. Will d/c with PMD follow up. Return precautions provided.

## 2025-02-28 NOTE — ED ADULT NURSE NOTE - CHIEF COMPLAINT QUOTE
Presented to ED c/o right sided headache that started today, has h/o migraines with numbness on face as auras. Also c/o vomiting.

## 2025-02-28 NOTE — ED ADULT TRIAGE NOTE - CHIEF COMPLAINT QUOTE
"Aox4. VSS, weaned from 2L to 1-L NC overnight.  Desats to high 80's w activity.  Trace edema BLE.  Denies SOB & chest pain. Mg 1.6, replaced, recheck at noon. K recheck AM.  PT & speech following.  CC following for discharge plan.          Goal Outcome Evaluation:      Plan of Care Reviewed With: patient    Overall Patient Progress: improvingOverall Patient Progress: improving    Outcome Evaluation: pt steady on feet, denies numbess/tingling, denies SOB & pain      Problem: Adult Inpatient Plan of Care  Goal: Plan of Care Review  Description: The Plan of Care Review/Shift note should be completed every shift.  The Outcome Evaluation is a brief statement about your assessment that the patient is improving, declining, or no change.  This information will be displayed automatically on your shift  note.  Outcome: Progressing  Flowsheets (Taken 2/14/2025 0814)  Outcome Evaluation: pt steady on feet, denies numbess/tingling, denies SOB & pain  Plan of Care Reviewed With: patient  Overall Patient Progress: improving  Goal: Patient-Specific Goal (Individualized)  Description: You can add care plan individualizations to a care plan. Examples of Individualization might be:  \"Parent requests to be called daily at 9am for status\", \"I have a hard time hearing out of my right ear\", or \"Do not touch me to wake me up as it startles  me\".  Outcome: Progressing  Goal: Absence of Hospital-Acquired Illness or Injury  Outcome: Progressing  Intervention: Identify and Manage Fall Risk  Recent Flowsheet Documentation  Taken 2/14/2025 0405 by Cally Simon RN  Safety Promotion/Fall Prevention: safety round/check completed  Taken 2/13/2025 2100 by Cally Simon RN  Safety Promotion/Fall Prevention:   safety round/check completed   activity supervised  Intervention: Prevent Skin Injury  Recent Flowsheet Documentation  Taken 2/13/2025 2102 by Cally Simon, RN  Body Position:   position changed independently   turned   upper " extremity elevated   weight shifting  Taken 2/13/2025 2100 by Cally Simon RN  Body Position:   position changed independently   turned   upper extremity elevated   weight shifting  Skin Protection:   adhesive use limited   tubing/devices free from skin contact  Intervention: Prevent and Manage VTE (Venous Thromboembolism) Risk  Recent Flowsheet Documentation  Taken 2/13/2025 2100 by Cally Simon RN  VTE Prevention/Management: (eliquis) SCDs off (sequential compression devices)  Intervention: Prevent Infection  Recent Flowsheet Documentation  Taken 2/13/2025 2100 by Cally Simon RN  Infection Prevention:   rest/sleep promoted   hand hygiene promoted   single patient room provided  Goal: Optimal Comfort and Wellbeing  Outcome: Progressing  Goal: Readiness for Transition of Care  Outcome: Progressing     Problem: Comorbidity Management  Goal: Maintenance of Asthma Control  Outcome: Progressing  Goal: Maintenance of Behavioral Health Symptom Control  Outcome: Progressing  Goal: Maintenance of COPD Symptom Control  Outcome: Progressing  Goal: Blood Glucose Levels Within Targeted Range  Outcome: Progressing  Goal: Maintenance of Heart Failure Symptom Control  Outcome: Progressing  Goal: Blood Pressure in Desired Range  Outcome: Progressing  Goal: Maintenance of Osteoarthritis Symptom Control  Outcome: Progressing  Intervention: Maintain Osteoarthritis Symptom Control  Recent Flowsheet Documentation  Taken 2/13/2025 2100 by Cally Simon RN  Assistive Device Utilized: (refused gb) walker  Activity Management:   activity adjusted per tolerance   ambulated to bathroom   back to bed  Goal: Bariatric Home Regimen Maintained  Outcome: Progressing  Goal: Maintenance of Seizure Control  Outcome: Progressing     Problem: Dysrhythmia  Goal: Normalized Cardiac Rhythm  Outcome: Progressing  Intervention: Monitor and Manage Cardiac Rhythm Effect  Recent Flowsheet Documentation  Taken 2/13/2025 2100 by  Alfred, Cally S, RN  VTE Prevention/Management: (eliquis) SCDs off (sequential compression devices)     Problem: Pneumonia  Goal: Fluid Balance  Outcome: Progressing  Goal: Resolution of Infection Signs and Symptoms  Outcome: Progressing  Goal: Effective Oxygenation and Ventilation  Outcome: Progressing  Intervention: Promote Airway Secretion Clearance  Recent Flowsheet Documentation  Taken 2/13/2025 2100 by Cally Simon RN  Cough And Deep Breathing: done independently per patient  Activity Management:   activity adjusted per tolerance   ambulated to bathroom   back to bed  Intervention: Optimize Oxygenation and Ventilation  Recent Flowsheet Documentation  Taken 2/13/2025 2102 by Cally Simon RN  Head of Bed (HOB) Positioning: HOB at 30-45 degrees  Taken 2/13/2025 2100 by Cally Simon RN  Head of Bed (HOB) Positioning: HOB at 30-45 degrees     Problem: Skin Injury Risk Increased  Goal: Skin Health and Integrity  Outcome: Progressing  Intervention: Plan: Nurse Driven Intervention: Moisture Management  Recent Flowsheet Documentation  Taken 2/13/2025 2100 by Cally Simon RN  Moisture Interventions:   Encourage regular toileting   Incontinence pad   Perineal cleanser  Plan: Moisture Management: encourage regular toileting  Taken 2/13/2025 2000 by Cally Simon RN  Moisture Interventions: Encourage regular toileting  Bathing/Skin Care: incontinence care  Intervention: Plan: Nurse Driven Intervention: Friction and Shear  Recent Flowsheet Documentation  Taken 2/13/2025 2100 by Cally Simon RN  Friction/Shear Interventions: HOB 30 degrees or less  Intervention: Optimize Skin Protection  Recent Flowsheet Documentation  Taken 2/13/2025 2102 by Cally Simon RN  Head of Bed (HOB) Positioning: HOB at 30-45 degrees  Taken 2/13/2025 2100 by Cally Simon RN  Pressure Reduction Techniques: frequent weight shift encouraged  Pressure Reduction Devices: positioning supports  utilized  Skin Protection:   adhesive use limited   tubing/devices free from skin contact  Activity Management:   activity adjusted per tolerance   ambulated to bathroom   back to bed  Head of Bed (HOB) Positioning: HOB at 30-45 degrees     Problem: Fall Injury Risk  Goal: Absence of Fall and Fall-Related Injury  Outcome: Progressing  Intervention: Promote Injury-Free Environment  Recent Flowsheet Documentation  Taken 2/14/2025 0405 by Cally Simon RN  Safety Promotion/Fall Prevention: safety round/check completed  Taken 2/13/2025 2100 by Cally Simon RN  Safety Promotion/Fall Prevention:   safety round/check completed   activity supervised     Problem: Pain Acute  Goal: Optimal Pain Control and Function  Outcome: Progressing  Intervention: Optimize Psychosocial Wellbeing  Recent Flowsheet Documentation  Taken 2/13/2025 2100 by Cally Simon RN  Diversional Activities: television      Presented to ED c/o right sided headache that started today, has h/o migraines with numbness on face as auras. Also c/o vomiting.

## 2025-02-28 NOTE — ED ADULT NURSE NOTE - NSFALLUNIVINTERV_ED_ALL_ED
Bed/Stretcher in lowest position, wheels locked, appropriate side rails in place/Call bell, personal items and telephone in reach/Instruct patient to call for assistance before getting out of bed/chair/stretcher/Non-slip footwear applied when patient is off stretcher/[place] to call system/Physically safe environment - no spills, clutter or unnecessary equipment/Purposeful proactive rounding/Room/bathroom lighting operational, light cord in reach

## 2025-02-28 NOTE — ED PROVIDER NOTE - PHYSICAL EXAMINATION
pt in NAD, AAOx3, head NC/AT, CN II-XII intact, PEERL, EOMi, TM (-)hemotympanum, neck (-) midline tenderness, lungs CTA B/L, CV S1S2 regular, abdomen soft/NT/ND/(+)BS, ext (-) edema, motor 5/5x4, sensation intact, ambulating with steady gait

## 2025-04-24 ENCOUNTER — APPOINTMENT (OUTPATIENT)
Dept: VASCULAR SURGERY | Facility: CLINIC | Age: 25
End: 2025-04-24
Payer: COMMERCIAL

## 2025-04-24 VITALS
DIASTOLIC BLOOD PRESSURE: 71 MMHG | BODY MASS INDEX: 28 KG/M2 | WEIGHT: 200 LBS | SYSTOLIC BLOOD PRESSURE: 136 MMHG | HEIGHT: 71 IN

## 2025-04-24 DIAGNOSIS — I82.409 ACUTE EMBOLISM AND THROMBOSIS OF UNSPECIFIED DEEP VEINS OF UNSPECIFIED LOWER EXTREMITY: ICD-10-CM

## 2025-04-24 DIAGNOSIS — M79.89 OTHER SPECIFIED SOFT TISSUE DISORDERS: ICD-10-CM

## 2025-04-24 PROCEDURE — 93971 EXTREMITY STUDY: CPT

## 2025-04-24 PROCEDURE — 99212 OFFICE O/P EST SF 10 MIN: CPT

## 2025-04-24 RX ORDER — APIXABAN 5 MG/1
5 TABLET, FILM COATED ORAL
Qty: 180 | Refills: 3 | Status: ACTIVE | COMMUNITY
Start: 2025-04-24 | End: 1900-01-01